# Patient Record
Sex: FEMALE | Race: WHITE | NOT HISPANIC OR LATINO
[De-identification: names, ages, dates, MRNs, and addresses within clinical notes are randomized per-mention and may not be internally consistent; named-entity substitution may affect disease eponyms.]

---
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
## 2023-02-09 ENCOUNTER — APPOINTMENT (OUTPATIENT)
Dept: ORTHOPEDIC SURGERY | Facility: CLINIC | Age: 23
End: 2023-02-09
Payer: COMMERCIAL

## 2023-02-09 VITALS — WEIGHT: 120 LBS | BODY MASS INDEX: 21.26 KG/M2 | HEIGHT: 63 IN

## 2023-02-09 PROCEDURE — 99203 OFFICE O/P NEW LOW 30 MIN: CPT

## 2023-02-09 PROCEDURE — 73110 X-RAY EXAM OF WRIST: CPT | Mod: LT

## 2023-02-09 NOTE — HISTORY OF PRESENT ILLNESS
[Student] : Work status: student [Sudden] : sudden [6] : 6 [4] : 4 [Dull/Aching] : dull/aching [Localized] : localized [Intermittent] : intermittent [Leisure] : leisure [Rest] : rest [Exercising] : exercising [de-identified] : 2/9/2023: Pt is a 23 year old RHD F who presents today for evaluation of their left wrist. Pt states that she was exercising and injured her wrist >1 month ago. Pain localized along the ulnar wrist. Brace  x 4 weeks w/o relief. \par Denies previous injury. No numbness/tingling. Ice to affected area. No formal treatment to date.\par \par PMH: denied\par Allergies: NKDA.  [] : Post Surgical Visit: no [FreeTextEntry1] : L wrist

## 2023-02-09 NOTE — IMAGING
[de-identified] : Left wrist with no skin changes / bony deformity.\par TTP over the TFCC.\par Pain is noted with supination to the ulnar wrist.\par ROM is full. There is no crepitus noted. \par Strength is 5/5 in all planes.\par TFCC Grind Test is mildly positive.\par Arzate Test is negative.\par Finkelstein Test is negative.\par  and intrinsic strength is 5/5.\par All digits are nvi with FAROM. \par

## 2023-02-09 NOTE — ASSESSMENT
[FreeTextEntry1] : we reviewed the anatomy, pathology, and treatment options for TFCC tears. We discussed that most of the TFCC is avascular and tears are slow to heal if at all but that surgical results are not guaranteed due to the poor healing capacity of the structure.  Even at surgery, most tears cannot be repaired, but are merely debrided.  We discussed the use of nsaids, bracing, rest, local modalities, injection and surgery in the treatment of TFCC tears. At this point, the patient will proceed with non-operative treatment. \par will try wrist widget\par PT

## 2023-06-06 ENCOUNTER — APPOINTMENT (OUTPATIENT)
Dept: ORTHOPEDIC SURGERY | Facility: CLINIC | Age: 23
End: 2023-06-06
Payer: COMMERCIAL

## 2023-06-06 VITALS — BODY MASS INDEX: 21.26 KG/M2 | WEIGHT: 120 LBS | HEIGHT: 63 IN

## 2023-06-06 DIAGNOSIS — S69.82XA OTHER SPECIFIED INJURIES OF LEFT WRIST, HAND AND FINGER(S), INITIAL ENCOUNTER: ICD-10-CM

## 2023-06-06 PROCEDURE — 99213 OFFICE O/P EST LOW 20 MIN: CPT

## 2023-06-06 PROCEDURE — L3908: CPT

## 2023-06-06 NOTE — IMAGING
[de-identified] : Left wrist with no skin changes / bony deformity.\par TTP over the TFCC.\par Pain is noted with supination to the ulnar wrist.\par ROM is full. There is no crepitus noted. \par Strength is 5/5 in all planes.\par TFCC Grind Test is mildly positive.\par Arzate Test is negative.\par Finkelstein Test is negative.\par  and intrinsic strength is 5/5.\par All digits are nvi with FAROM. \par

## 2023-06-06 NOTE — REASON FOR VISIT
[FreeTextEntry2] : Follow up visit for left wrist.  pt states pain has been on and off since last visit

## 2023-06-06 NOTE — HISTORY OF PRESENT ILLNESS
[de-identified] : 6/6/23:  Pt tried wrist widget with minimal relief.\par \par 2/9/2023: Pt is a 23 year old RHD F who presents today for evaluation of their left wrist. Pt states that she was exercising and injured her wrist >1 month ago. Pain localized along the ulnar wrist. Brace  x 4 weeks w/o relief. \par Denies previous injury. No numbness/tingling. Ice to affected area. No formal treatment to date.\par \par PMH: denied\par Allergies: NKDA.

## 2023-06-08 ENCOUNTER — FORM ENCOUNTER (OUTPATIENT)
Age: 23
End: 2023-06-08

## 2023-06-09 ENCOUNTER — APPOINTMENT (OUTPATIENT)
Dept: MRI IMAGING | Facility: CLINIC | Age: 23
End: 2023-06-09
Payer: COMMERCIAL

## 2023-06-09 PROCEDURE — 73221 MRI JOINT UPR EXTREM W/O DYE: CPT | Mod: LT

## 2023-06-15 ENCOUNTER — APPOINTMENT (OUTPATIENT)
Dept: ORTHOPEDIC SURGERY | Facility: CLINIC | Age: 23
End: 2023-06-15

## 2023-07-12 ENCOUNTER — APPOINTMENT (OUTPATIENT)
Dept: ORTHOPEDIC SURGERY | Facility: CLINIC | Age: 23
End: 2023-07-12

## 2024-01-03 ENCOUNTER — APPOINTMENT (OUTPATIENT)
Dept: ORTHOPEDIC SURGERY | Facility: CLINIC | Age: 24
End: 2024-01-03
Payer: COMMERCIAL

## 2024-01-03 DIAGNOSIS — M76.71 PERONEAL TENDINITIS, RIGHT LEG: ICD-10-CM

## 2024-01-03 DIAGNOSIS — M76.72 PERONEAL TENDINITIS, RIGHT LEG: ICD-10-CM

## 2024-01-03 PROCEDURE — 73630 X-RAY EXAM OF FOOT: CPT | Mod: 50

## 2024-01-03 PROCEDURE — 99204 OFFICE O/P NEW MOD 45 MIN: CPT

## 2024-01-03 NOTE — PHYSICAL EXAM
[Bilateral] : foot and ankle bilaterally [NL (40)] : plantar flexion 40 degrees [NL 30)] : inversion 30 degrees [NL (20)] : eversion 20 degrees [5___] : eversion 5[unfilled]/5 [2+] : posterior tibialis pulse: 2+ [] : no peroneal tendon subluxation with circumduction

## 2024-01-03 NOTE — DATA REVIEWED
[FreeTextEntry1] : 3 views bilateral feet performed at [facility]: These films are independently turbid by myself.  They are negative for fracture or dislocation.  There is evidence of bilateral bipartite tibial sesamoid.  There is no significant tibiotalar subtalar midfoot or forefoot arthrosis.  No significant disruption in Meary's angle.  Normal calcaneal pitch.

## 2024-01-03 NOTE — HISTORY OF PRESENT ILLNESS
[de-identified] : Date of Injury/Onset: Patient has had pain since Monday morning. Patient states she ran on Sunday in the wrong shoes and is caused from that. Pain is shooting up to the ankles. Pain: At Rest: With Activity: 10 Mechanism of injury:Running This is NOT a Work Related Injury being treated under Worker's Compensation. This is NOT an athletic injury occurring associated with an interscholastic or organized sports team. Quality of symptoms:  Sharp Improves with: Ice; OTC Medication Worse with: Weight bearing Prior treatment:None Prior Imaging:None Reports Available For Review Today: Out of work/sport: School/Sport/Position/Occupation: Personal goal: Additional Information: Patient states pain is localized to bilateral lateral side of ankle left worse than right.  Pain is along the peroneal tendon.  Is worse with prolonged standing and activity improved with rest.  She denies any prior history of this issue.  She denies being seen or evaluated by any other doctor; treatment, therapy injections or otherwise.  She is here for further evaluation management.

## 2024-01-03 NOTE — DISCUSSION/SUMMARY
[de-identified] : Patient seen and examined.  Patient presents today for bilateral foot pain left worse than right.  I discussed with patient that based on her history, physical examination, imaging that her symptoms are suggestive of bilateral peroneal tendinitis.  I discussed with her treatment options for this utilizing a ladder analogy with first-line treatment options being nonsteroidal anti-inflammatories, supportive shoe, activity modification of cutting running jumping.  I will plan to see her back in approximately 2 weeks time.  Should her symptoms fail to improve or worsen we will consider cam boot immobilization on the more symptomatic side.  We discussed the risk and benefits of nonsteroidal anti-inflammatories which include GI's, kidneys, bleeding issues.  Patient is in agreement with the plan we will plan to follow-up accordingly.  Plan for next visit: 1.  Assess pain and tenderness along peroneal tendons left worse than right today.  She had no motor weakness and no evidence of ankle instability.  There is no pain along the metatarsal suggestive of stress fracture.

## 2024-01-10 ENCOUNTER — APPOINTMENT (OUTPATIENT)
Dept: ORTHOPEDIC SURGERY | Facility: CLINIC | Age: 24
End: 2024-01-10

## 2024-02-01 ENCOUNTER — RX RENEWAL (OUTPATIENT)
Age: 24
End: 2024-02-01

## 2024-02-01 RX ORDER — MELOXICAM 7.5 MG/1
7.5 TABLET ORAL
Qty: 30 | Refills: 0 | Status: ACTIVE | COMMUNITY
Start: 2024-01-03 | End: 1900-01-01

## 2024-03-13 ENCOUNTER — OFFICE (OUTPATIENT)
Dept: URBAN - METROPOLITAN AREA CLINIC 77 | Facility: CLINIC | Age: 24
Setting detail: OPHTHALMOLOGY
End: 2024-03-13
Payer: COMMERCIAL

## 2024-03-13 DIAGNOSIS — H50.52: ICD-10-CM

## 2024-03-13 DIAGNOSIS — H51.11: ICD-10-CM

## 2024-03-13 DIAGNOSIS — H53.10: ICD-10-CM

## 2024-03-13 DIAGNOSIS — D35.2: ICD-10-CM

## 2024-03-13 DIAGNOSIS — Q14.1: ICD-10-CM

## 2024-03-13 DIAGNOSIS — H35.40: ICD-10-CM

## 2024-03-13 DIAGNOSIS — H40.013: ICD-10-CM

## 2024-03-13 PROCEDURE — 92083 EXTENDED VISUAL FIELD XM: CPT | Performed by: OPHTHALMOLOGY

## 2024-03-13 PROCEDURE — 92250 FUNDUS PHOTOGRAPHY W/I&R: CPT | Performed by: OPHTHALMOLOGY

## 2024-03-13 PROCEDURE — 92060 SENSORIMOTOR EXAMINATION: CPT | Performed by: OPHTHALMOLOGY

## 2024-03-13 PROCEDURE — 99214 OFFICE O/P EST MOD 30 MIN: CPT | Performed by: OPHTHALMOLOGY

## 2024-03-13 ASSESSMENT — REFRACTION_MANIFEST
OD_VA1: 20/20
OD_CYLINDER: +0.25
OS_VA1: 20/20
OD_SPHERE: +0.50
OS_SPHERE: +0.75
OD_AXIS: 060

## 2024-03-13 ASSESSMENT — SPHEQUIV_DERIVED: OD_SPHEQUIV: 0.625

## 2024-03-13 ASSESSMENT — LID POSITION - PTOSIS: OS_PTOSIS: T

## 2025-07-31 ENCOUNTER — APPOINTMENT (OUTPATIENT)
Dept: ORTHOPEDIC SURGERY | Facility: CLINIC | Age: 25
End: 2025-07-31
Payer: COMMERCIAL

## 2025-07-31 PROCEDURE — 73610 X-RAY EXAM OF ANKLE: CPT | Mod: LT

## 2025-07-31 PROCEDURE — 99203 OFFICE O/P NEW LOW 30 MIN: CPT | Mod: 25

## 2025-07-31 PROCEDURE — 29515 APPLICATION SHORT LEG SPLINT: CPT | Mod: LT

## 2025-07-31 RX ORDER — MELOXICAM 15 MG/1
15 TABLET ORAL
Qty: 30 | Refills: 2 | Status: ACTIVE | COMMUNITY
Start: 2025-07-31 | End: 1900-01-01

## 2025-08-01 ENCOUNTER — APPOINTMENT (OUTPATIENT)
Dept: ORTHOPEDIC SURGERY | Facility: CLINIC | Age: 25
End: 2025-08-01
Payer: SELF-PAY

## 2025-08-01 DIAGNOSIS — S93.602A UNSPECIFIED SPRAIN OF LEFT FOOT, INITIAL ENCOUNTER: ICD-10-CM

## 2025-08-01 DIAGNOSIS — S93.492A SPRAIN OF OTHER LIGAMENT OF LEFT ANKLE, INITIAL ENCOUNTER: ICD-10-CM

## 2025-08-01 PROCEDURE — 99214 OFFICE O/P EST MOD 30 MIN: CPT

## 2025-08-18 ENCOUNTER — APPOINTMENT (OUTPATIENT)
Dept: ORTHOPEDIC SURGERY | Facility: CLINIC | Age: 25
End: 2025-08-18